# Patient Record
Sex: FEMALE | Race: OTHER | HISPANIC OR LATINO | Employment: FULL TIME | URBAN - METROPOLITAN AREA
[De-identification: names, ages, dates, MRNs, and addresses within clinical notes are randomized per-mention and may not be internally consistent; named-entity substitution may affect disease eponyms.]

---

## 2019-12-20 ENCOUNTER — OFFICE VISIT (OUTPATIENT)
Dept: URGENT CARE | Facility: CLINIC | Age: 49
End: 2019-12-20
Payer: OTHER MISCELLANEOUS

## 2019-12-20 VITALS
WEIGHT: 211 LBS | BODY MASS INDEX: 38.83 KG/M2 | HEIGHT: 62 IN | SYSTOLIC BLOOD PRESSURE: 132 MMHG | OXYGEN SATURATION: 98 % | HEART RATE: 73 BPM | DIASTOLIC BLOOD PRESSURE: 76 MMHG | TEMPERATURE: 99 F | RESPIRATION RATE: 12 BRPM

## 2019-12-20 DIAGNOSIS — S20.229A CONTUSION OF BACK, UNSPECIFIED LATERALITY, INITIAL ENCOUNTER: Primary | ICD-10-CM

## 2019-12-20 DIAGNOSIS — Z02.6 ENCOUNTER RELATED TO WORKER'S COMPENSATION CLAIM: ICD-10-CM

## 2019-12-20 DIAGNOSIS — M54.6 ACUTE BILATERAL THORACIC BACK PAIN: ICD-10-CM

## 2019-12-20 PROCEDURE — 99203 OFFICE O/P NEW LOW 30 MIN: CPT | Mod: S$GLB,,, | Performed by: FAMILY MEDICINE

## 2019-12-20 PROCEDURE — 99203 PR OFFICE/OUTPT VISIT, NEW, LEVL III, 30-44 MIN: ICD-10-PCS | Mod: S$GLB,,, | Performed by: FAMILY MEDICINE

## 2019-12-20 PROCEDURE — 72070 XR THORACIC SPINE AP LATERAL: ICD-10-PCS | Mod: FY,S$GLB,, | Performed by: RADIOLOGY

## 2019-12-20 PROCEDURE — 72070 X-RAY EXAM THORAC SPINE 2VWS: CPT | Mod: FY,S$GLB,, | Performed by: RADIOLOGY

## 2019-12-20 RX ORDER — IBUPROFEN 800 MG/1
800 TABLET ORAL 3 TIMES DAILY
Qty: 30 TABLET | Refills: 0 | Status: SHIPPED | OUTPATIENT
Start: 2019-12-20 | End: 2019-12-30

## 2019-12-20 RX ORDER — LISINOPRIL 10 MG/1
TABLET ORAL
COMMUNITY

## 2019-12-20 RX ORDER — LEVOTHYROXINE SODIUM 100 UG/1
TABLET ORAL
COMMUNITY

## 2019-12-20 RX ORDER — CYCLOBENZAPRINE HCL 10 MG
10 TABLET ORAL NIGHTLY
Qty: 30 TABLET | Refills: 0 | Status: SHIPPED | OUTPATIENT
Start: 2019-12-20 | End: 2020-01-19

## 2019-12-20 NOTE — PROGRESS NOTES
Subjective:       Patient ID: Glenda Reyes is a 49 y.o. female.    Chief Complaint: Back Pain (Hit with door in mid upper back today at 0925 at work)    [facility] C/O upper back pain after door hit her upper back at work today at approx. 0930. Handle on freezer door hit upper back     Back Pain   This is a new problem. The current episode started today. The problem occurs constantly. The problem is unchanged. The pain is present in the thoracic spine. Radiates to: bilat shoulder. The pain is at a severity of 10/10. Pertinent negatives include no abdominal pain, bladder incontinence, bowel incontinence, chest pain, dysuria, fever, headaches, leg pain, numbness, paresis, paresthesias, pelvic pain, perianal numbness, tingling, weakness or weight loss. She has tried NSAIDs for the symptoms. The treatment provided no relief.       Constitution: Negative for fever.   Cardiovascular: Negative for chest pain.   Gastrointestinal: Negative for abdominal pain and bowel incontinence.   Genitourinary: Negative for dysuria, urgency, bladder incontinence, hematuria and pelvic pain.   Musculoskeletal: Positive for back pain.   Skin: Negative for rash.   Neurological: Negative for coordination disturbances, headaches, numbness and tingling.        Objective:      Physical Exam   Constitutional: She is oriented to person, place, and time. She appears well-developed and well-nourished. She is cooperative.  Non-toxic appearance. She does not appear ill. No distress.   HENT:   Head: Normocephalic and atraumatic.   Right Ear: Hearing, tympanic membrane and ear canal normal.   Left Ear: Hearing, tympanic membrane and ear canal normal.   Nose: No mucosal edema, rhinorrhea or nasal deformity. No epistaxis. Right sinus exhibits no maxillary sinus tenderness and no frontal sinus tenderness. Left sinus exhibits no maxillary sinus tenderness and no frontal sinus tenderness.   Mouth/Throat: Uvula is midline and mucous membranes are normal. No  trismus in the jaw. Normal dentition. No uvula swelling. No posterior oropharyngeal erythema.   Eyes: Conjunctivae and lids are normal. Right eye exhibits no discharge. Left eye exhibits no discharge. No scleral icterus.   Sclera clear bilat   Neck: Trachea normal, normal range of motion, full passive range of motion without pain and phonation normal. Neck supple.   Cardiovascular: Normal rate, regular rhythm, normal heart sounds, intact distal pulses and normal pulses.   Pulmonary/Chest: Effort normal and breath sounds normal. No respiratory distress.   Abdominal: Soft. Normal appearance and bowel sounds are normal. She exhibits no distension and no pulsatile midline mass. There is no tenderness.   Musculoskeletal:        Arms:  Bilateral mid back muscles swelling, tenderness/tightness.  No T spine tenderness.  No bruise, no redness.   Neurological: She is alert and oriented to person, place, and time. She exhibits normal muscle tone. Coordination normal.   Skin: Skin is warm, dry and intact. She is not diaphoretic. No pallor.   Psychiatric: She has a normal mood and affect. Her speech is normal and behavior is normal. Judgment and thought content normal. Cognition and memory are normal.   Nursing note and vitals reviewed.      Assessment:       1. Contusion of back, unspecified laterality, initial encounter    2. Encounter related to worker's compensation claim    3. Acute bilateral thoracic back pain        Plan:         Medications Ordered This Encounter   Medications    cyclobenzaprine (FLEXERIL) 10 MG tablet     Sig: Take 1 tablet (10 mg total) by mouth every evening.     Dispense:  30 tablet     Refill:  0    ibuprofen (ADVIL,MOTRIN) 800 MG tablet     Sig: Take 1 tablet (800 mg total) by mouth 3 (three) times daily. for 10 days     Dispense:  30 tablet     Refill:  0         Patient Instructions     Back Contusion     You have a contusion to your back. A contusion is also called a bruise. There is swelling  and some bleeding under the skin. The skin may be purplish. You may have muscle aching and stiffness in the area of the bruise. There are no broken bones.  Contusions heal on their own, without further treatment. However, pain and skin discoloration may take weeks to months to go away.   Home care  · Rest. Avoid heavy lifting, strenuous exertion, or any activity that causes pain.  · Ice the area to reduce pain and swelling. Put ice cubes in a plastic bag or use a cold pack. (Wrap the cold source in a thin towel. Do not place it directly on your skin.) Ice the injured area for 20 minutes every 1-2 hours the first day. Continue with ice packs 3-4 times a day for the next two days, then as needed for the relief of pain and swelling.  · Take any prescribed pain medication. If none was prescribed, take acetaminophen, ibuprofen, or naproxen to control pain.  Follow-up care  Follow up with your healthcare provider, or as directed. Call if you are not better in 1-2 weeks.  When to seek medical advice  Call your healthcare provider for any of the following:  · New or worsening pain  · Increased swelling around the bruise  · Pain spreads to one or both legs  · Weakness or numbness in one or both legs   · Loss of bowel or bladder control  · Numbness in the groin or genital area  · Fever of 100.4°F (38ºC) or higher, or as directed by your healthcare provider  Date Last Reviewed: 6/26/2015  © 9728-5954 [employer]. [address] [place] [address], [place] [address]. All rights reserved. This information is not intended as a substitute for professional medical care. Always follow your healthcare professional's instructions.      Follow up with [facility] [address] on Monday (12/23/19)    Solitario Ness MD         No follow-ups on file.

## 2019-12-20 NOTE — PROGRESS NOTES
Subjective:       Patient ID: Glenda Reyes is a 49 y.o. female.    Vitals:  vitals were not taken for this visit.     Chief Complaint: No chief complaint on file.    HPI  ROS    Objective:      Physical Exam      Assessment:       1. Encounter related to worker's compensation claim        Plan:         Encounter related to worker's compensation claim

## 2019-12-21 NOTE — PATIENT INSTRUCTIONS
Back Contusion     You have a contusion to your back. A contusion is also called a bruise. There is swelling and some bleeding under the skin. The skin may be purplish. You may have muscle aching and stiffness in the area of the bruise. There are no broken bones.  Contusions heal on their own, without further treatment. However, pain and skin discoloration may take weeks to months to go away.   Home care  · Rest. Avoid heavy lifting, strenuous exertion, or any activity that causes pain.  · Ice the area to reduce pain and swelling. Put ice cubes in a plastic bag or use a cold pack. (Wrap the cold source in a thin towel. Do not place it directly on your skin.) Ice the injured area for 20 minutes every 1-2 hours the first day. Continue with ice packs 3-4 times a day for the next two days, then as needed for the relief of pain and swelling.  · Take any prescribed pain medication. If none was prescribed, take acetaminophen, ibuprofen, or naproxen to control pain.  Follow-up care  Follow up with your healthcare provider, or as directed. Call if you are not better in 1-2 weeks.  When to seek medical advice  Call your healthcare provider for any of the following:  · New or worsening pain  · Increased swelling around the bruise  · Pain spreads to one or both legs  · Weakness or numbness in one or both legs   · Loss of bowel or bladder control  · Numbness in the groin or genital area  · Fever of 100.4°F (38ºC) or higher, or as directed by your healthcare provider  Date Last Reviewed: 6/26/2015 © 2000-2017 [employer]. [address] [place] [address], [place] [address]. All rights reserved. This information is not intended as a substitute for professional medical care. Always follow your healthcare professional's instructions.      Follow up with [facility] [address] on Monday (12/23/19)    Solitario Ness MD

## 2019-12-23 ENCOUNTER — OFFICE VISIT (OUTPATIENT)
Dept: URGENT CARE | Facility: CLINIC | Age: 49
End: 2019-12-23
Payer: OTHER MISCELLANEOUS

## 2019-12-23 VITALS
TEMPERATURE: 99 F | RESPIRATION RATE: 14 BRPM | HEIGHT: 62 IN | OXYGEN SATURATION: 98 % | BODY MASS INDEX: 38.83 KG/M2 | SYSTOLIC BLOOD PRESSURE: 136 MMHG | HEART RATE: 80 BPM | WEIGHT: 211 LBS | DIASTOLIC BLOOD PRESSURE: 88 MMHG

## 2019-12-23 DIAGNOSIS — S20.229A CONTUSION OF BACK, UNSPECIFIED LATERALITY, INITIAL ENCOUNTER: Primary | ICD-10-CM

## 2019-12-23 DIAGNOSIS — M54.6 ACUTE BILATERAL THORACIC BACK PAIN: ICD-10-CM

## 2019-12-23 DIAGNOSIS — Z02.6 ENCOUNTER RELATED TO WORKER'S COMPENSATION CLAIM: ICD-10-CM

## 2019-12-23 DIAGNOSIS — M62.830 BACK MUSCLE SPASM: ICD-10-CM

## 2019-12-23 PROCEDURE — 99214 PR OFFICE/OUTPT VISIT, EST, LEVL IV, 30-39 MIN: ICD-10-PCS | Mod: S$GLB,,, | Performed by: NURSE PRACTITIONER

## 2019-12-23 PROCEDURE — 99214 OFFICE O/P EST MOD 30 MIN: CPT | Mod: S$GLB,,, | Performed by: NURSE PRACTITIONER

## 2019-12-23 NOTE — LETTER
[facility]  [address], [address]  [place] [address]  Phone: [phone]  Fax: [phone]  [facility]: [email or web address]    Pt Name: Glenda Reyes Injury Date: 12/20/2019   Employee ID: [id number] Date of First Treatment: 12/23/2019   Company: [employer]      Appointment Time: 02:30 PM Arrived: 2:46 p.m.   Provider: Opal Hernández NP Time Out: 4:31 p.m.     Office Treatment:   EXAM   X-Ray done  Restrictions: No lifting/pushing/pulling more than 10 lbs  Patient Instructions: Attention not to aggravate affected area, Daily home exercises/warm soaks(Take Ibuprofen and Flexeril as prescribed. Do not take Flexeril at work.)  1. Contusion of back, unspecified laterality, initial encounter    2. Acute bilateral thoracic back pain    3. Back muscle spasm    4. Encounter related to worker's compensation claim          Patient Instructions: Attention not to aggravate affected area, Daily home exercises/warm soaks(Take Ibuprofen and Flexeril as prescribed. Do not take Flexeril at work.)    Restrictions: No lifting/pushing/pulling more than 10 lbs     Return Appointment: 12/30/2019 at 2:00 p.m.  [place]

## 2019-12-23 NOTE — PROGRESS NOTES
Subjective:       Patient ID: Glenda Reyes is a 49 y.o. female.    Chief Complaint: Back Pain (12/21/19)    Pt is a f/u from the Urgent Care for back pain from 12/21/19 @ 0930. Pt states se was hit by a freezer door in the bottom of her back. C/o pain with stiffness and increases with walking and exertion 10/10, stiffness is present throughout the day. Pt states she has not been able to fill the flexeril yet but is taking the Motrin.  Ambulatory. MJB   She was tx in . X-rays of thoracic back were done (negative). Rx given for Flexeril & Ibuprofen. She will  the Rx today. Has been taking Tylenol without much relief. Has returned to work LD. No previous back injuries. Her daughter is with her today. MWT    Back Pain   This is a new problem. The problem has been rapidly improving since onset. The pain is present in the lumbar spine. The pain does not radiate. The pain is at a severity of 10/10. The pain is moderate. The pain is the same all the time. The symptoms are aggravated by position. Pertinent negatives include no chest pain, dysuria, fever, headaches or weakness. She has tried NSAIDs for the symptoms. The treatment provided significant relief.       Constitution: Negative for chills, fatigue and fever.   HENT: Negative for congestion and sore throat.    Neck: Negative for painful lymph nodes.   Cardiovascular: Negative for chest pain and leg swelling.   Eyes: Negative for double vision and blurred vision.   Respiratory: Negative for cough and shortness of breath.    Gastrointestinal: Negative for nausea, vomiting and diarrhea.   Genitourinary: Negative for dysuria, frequency, urgency and history of kidney stones.   Musculoskeletal: Positive for back pain. Negative for joint pain, joint swelling, muscle cramps, muscle ache and history of spine disorder.   Skin: Negative for color change, pale, rash, laceration, erythema and bruising.   Allergic/Immunologic: Negative for seasonal allergies.    Neurological: Negative for dizziness, history of vertigo, light-headedness, passing out and headaches.   Hematologic/Lymphatic: Negative for swollen lymph nodes.   Psychiatric/Behavioral: Negative for nervous/anxious, sleep disturbance and depression. The patient is not nervous/anxious.         Objective:      Physical Exam   Constitutional: She is oriented to person, place, and time. She appears well-developed and well-nourished. No distress.   HENT:   Right Ear: External ear normal.   Left Ear: External ear normal.   Nose: Nose normal.   Eyes: Conjunctivae are normal.   Neck: Normal range of motion.   Cardiovascular: Normal rate, regular rhythm, normal heart sounds and intact distal pulses.   Pulmonary/Chest: Effort normal.   Abdominal: Soft. Bowel sounds are normal.   Musculoskeletal: She exhibits tenderness.        Thoracic back: She exhibits decreased range of motion, tenderness, swelling, pain and spasm. She exhibits normal pulse.        Back:    Pain to thoracic back. Swelling/spasm noted to L side of back. TTP to L thoracic back. No tenderness to spine. Pain increased with ROM to shoulders. Good ROM to lower back without pain. NV intact UE & LE. Neg SLR bilaterally.   Neurological: She is alert and oriented to person, place, and time.   Skin: Skin is warm and dry. No erythema.   Psychiatric: She has a normal mood and affect. Her behavior is normal. Judgment and thought content normal.   Nursing note and vitals reviewed.      Assessment:       1. Contusion of back, unspecified laterality, initial encounter    2. Acute bilateral thoracic back pain    3. Back muscle spasm    4. Encounter related to worker's compensation claim        Plan:     X-ray Thoracic Spine Ap Lateral    Result Date: 12/20/2019  EXAMINATION: XR THORACIC SPINE AP LATERAL CLINICAL HISTORY: Pain in thoracic spine TECHNIQUE: AP and lateral views of the thoracic spine were performed. COMPARISON: None FINDINGS: There is mild leftward  curvature of the thoracic alignment.  The remainder of the alignment is unremarkable.  There is no evidence of listhesis.  The vertebral body heights are maintained.  The posterior elements are unremarkable.  The intervertebral disc spaces are within normal limits.  There is no evidence of acute fracture or listhesis of the thoracic spine. There are postoperative changes in the right upper quadrant.  The paraspinal soft tissues are otherwise unremarkable.     No evidence of acute fracture or listhesis of the thoracic spine.  Additional evaluation, as clinically warranted. Electronically signed by: Bryce Pritchett MD Date:    12/20/2019 Time:    18:54         Patient Instructions: Attention not to aggravate affected area, Daily home exercises/warm soaks(Take Ibuprofen and Flexeril as prescribed. Do not take Flexeril at work.)   Restrictions: No lifting/pushing/pulling more than 10 lbs  Follow up in about 1 week (around 12/30/2019).

## 2019-12-23 NOTE — PATIENT INSTRUCTIONS
Muscle Spasm  A muscle spasm is a sudden tightening of the muscle you cant control. This may be caused by strain, overworking the muscle, or injury. It can also be caused by dehydration, electrolyte imbalance, diabetes, alcohol use, and certain medicines. If it goes on long enough the muscle spasm causes pain. Common areas for muscle spasm are the legs, neck, and back.  Home care  · Heat, massage, and stretching will help relax muscle spasm.  · When the spasm is in your arm or leg, stretch the muscle passively. To do this, have someone bend or straighten the joint above or below the muscle until you feel the stretch on the sore muscle. You can stretch the muscle actively by moving the affected body part. This will stretch the muscle that is in spasm. For example, if the spasm is in your calf, bend the ankle so your toes point upward toward your knee. This will stretch your calf muscle.  · You may use over-the-counter pain medicine to control pain, unless another medicine was prescribed. If you have chronic liver or kidney disease or ever had a stomach ulcer or GI bleeding, talk with your healthcare provider before using these medicines.  Follow-up care  Follow up with your healthcare provider, or as advised.    When to seek medical advice  Call your healthcare provider right away if any of the following occur:  · Fingers or toes become swollen, cold, blue, numb, or tingly  · You develop weakness in the affected arm or leg  · Pain increases and is not controlled by the above measures  Date Last Reviewed: 11/21/2015  © 3395-4513 [employer]. [address] [place] [address], [place] [address]. All rights reserved. This information is not intended as a substitute for professional medical care. Always follow your healthcare professional's instructions.

## 2019-12-30 ENCOUNTER — OFFICE VISIT (OUTPATIENT)
Dept: URGENT CARE | Facility: CLINIC | Age: 49
End: 2019-12-30
Payer: OTHER MISCELLANEOUS

## 2019-12-30 DIAGNOSIS — M62.830 BACK MUSCLE SPASM: ICD-10-CM

## 2019-12-30 DIAGNOSIS — Z02.6 ENCOUNTER RELATED TO WORKER'S COMPENSATION CLAIM: ICD-10-CM

## 2019-12-30 DIAGNOSIS — M54.6 ACUTE BILATERAL THORACIC BACK PAIN: ICD-10-CM

## 2019-12-30 DIAGNOSIS — S20.229D CONTUSION OF BACK, UNSPECIFIED LATERALITY, SUBSEQUENT ENCOUNTER: Primary | ICD-10-CM

## 2019-12-30 PROCEDURE — 99213 PR OFFICE/OUTPT VISIT, EST, LEVL III, 20-29 MIN: ICD-10-PCS | Mod: S$GLB,,, | Performed by: NURSE PRACTITIONER

## 2019-12-30 PROCEDURE — 99213 OFFICE O/P EST LOW 20 MIN: CPT | Mod: S$GLB,,, | Performed by: NURSE PRACTITIONER

## 2019-12-30 NOTE — PROGRESS NOTES
"Subjective:       Patient ID: Glenda Reyes is a 49 y.o. female.    Chief Complaint: Back Pain (12/21/19)    [place] ONLY. Pt is a f/u for back pain from 12/21/19, WLD, 6/10, Advil,& home exercises helping. WLD. Ambulatory. MJB  She has not been able to get her Rx filled due to "pre authorization" needed. Was working without problems then told today that she cannot work because no light duty is available. Patient's daughter was with her this visit. MWT    Back Pain   This is a recurrent problem. The current episode started 1 to 4 weeks ago. The problem occurs daily. The problem has been gradually improving since onset. The pain is present in the lumbar spine. The quality of the pain is described as aching. The pain does not radiate. The pain is at a severity of 6/10. The pain is moderate. The pain is worse during the night. The symptoms are aggravated by bending, position and standing. Pertinent negatives include no chest pain, dysuria, fever, headaches or weakness. She has tried NSAIDs and muscle relaxant for the symptoms. The treatment provided mild relief.       Constitution: Negative for chills, fatigue and fever.   HENT: Negative for congestion and sore throat.    Neck: Negative for painful lymph nodes.   Cardiovascular: Negative for chest pain and leg swelling.   Eyes: Negative for double vision and blurred vision.   Respiratory: Negative for cough and shortness of breath.    Gastrointestinal: Negative for nausea, vomiting and diarrhea.   Genitourinary: Negative for dysuria, frequency, urgency and history of kidney stones.   Musculoskeletal: Positive for back pain. Negative for joint pain, joint swelling, muscle cramps and muscle ache.   Skin: Negative for color change, pale, rash, erythema and bruising.   Allergic/Immunologic: Negative for seasonal allergies.   Neurological: Negative for dizziness, history of vertigo, light-headedness, passing out and headaches.   Hematologic/Lymphatic: Negative for swollen " lymph nodes.   Psychiatric/Behavioral: Negative for nervous/anxious, sleep disturbance and depression. The patient is not nervous/anxious.         Objective:      Physical Exam   Constitutional: She is oriented to person, place, and time. She appears well-developed and well-nourished. No distress.   HENT:   Right Ear: External ear normal.   Left Ear: External ear normal.   Nose: Nose normal.   Eyes: Conjunctivae are normal.   Cardiovascular: Intact distal pulses.   Pulmonary/Chest: Effort normal.   Musculoskeletal: She exhibits tenderness.        Thoracic back: She exhibits tenderness, pain and spasm. She exhibits normal range of motion.        Back:    Pain and swelling noted to L side of thoracic back but improved since last week. Mild TTP. Spasm appreciated. FROM to back and shoulders with mild pain.   Neurological: She is alert and oriented to person, place, and time.   Skin: Skin is warm and dry. No erythema.   Psychiatric: She has a normal mood and affect. Her behavior is normal. Judgment normal.       Assessment:       1. Contusion of back, unspecified laterality, subsequent encounter    2. Acute bilateral thoracic back pain    3. Back muscle spasm    4. Encounter related to worker's compensation claim        Plan:            Patient Instructions: Attention not to aggravate affected area, Daily home exercises/warm soaks(May take OTC Ibprofen. Do not take Flexeril while working.)   Restrictions: Regular Duty  Follow up in about 1 week (around 1/6/2020).

## 2019-12-30 NOTE — LETTER
[facility]  [address], [address]  [place] [address]  Phone: [phone]  Fax: [phone]  [facility]: [email or web address]    Pt Name: Glenda Reyes  Injury Date: 12/20/2019   Employee ID:[id number] Date of  Treatment: 12/30/2019   Company:  [employer]      Appointment Time: 01:45 PM Time in : 2:04pm   Provider: [facility] Time Out:3:35pm     Office Treatment:     1. Contusion of back, unspecified laterality, subsequent encounter    2. Acute bilateral thoracic back pain    3. Back muscle spasm    4. Encounter related to worker's compensation claim          Patient Instructions: Attention not to aggravate affected area, Daily home exercises/warm soaks(May take OTC Ibprofen. Do not take Flexeril while working.)    Restrictions: Regular Duty     Return Appointment: 1/6/2020 at 1:00pm  LN

## 2020-01-06 ENCOUNTER — OFFICE VISIT (OUTPATIENT)
Dept: URGENT CARE | Facility: CLINIC | Age: 50
End: 2020-01-06
Payer: OTHER MISCELLANEOUS

## 2020-01-06 DIAGNOSIS — S20.229D CONTUSION OF BACK, UNSPECIFIED LATERALITY, SUBSEQUENT ENCOUNTER: Primary | ICD-10-CM

## 2020-01-06 DIAGNOSIS — M54.6 ACUTE BILATERAL THORACIC BACK PAIN: ICD-10-CM

## 2020-01-06 DIAGNOSIS — M62.830 BACK MUSCLE SPASM: ICD-10-CM

## 2020-01-06 DIAGNOSIS — Z02.6 ENCOUNTER RELATED TO WORKER'S COMPENSATION CLAIM: ICD-10-CM

## 2020-01-06 PROCEDURE — 99214 PR OFFICE/OUTPT VISIT, EST, LEVL IV, 30-39 MIN: ICD-10-PCS | Mod: S$GLB,,, | Performed by: NURSE PRACTITIONER

## 2020-01-06 PROCEDURE — 99214 OFFICE O/P EST MOD 30 MIN: CPT | Mod: S$GLB,,, | Performed by: NURSE PRACTITIONER

## 2020-01-06 NOTE — PROGRESS NOTES
Subjective:       Patient ID: Glenda Reyes is a 49 y.o. female.    Chief Complaint: Back Pain (12/21/19)    Pt is a f/u for back pain from 12/21/19, pain is 4/10, meds helping, working light duty. [place] Speaking only. Ambulatory. LIZ  Has started to take the Flexeril at bedtime and it has helped a lot. Overall feeling better. She is accompanied by her daughter. MWT    Back Pain   This is a recurrent problem. The current episode started 1 to 4 weeks ago. The problem occurs intermittently. The problem has been gradually improving since onset. The pain is present in the lumbar spine. The quality of the pain is described as aching. The pain does not radiate. The pain is at a severity of 4/10. The symptoms are aggravated by position. Pertinent negatives include no chest pain, dysuria, fever, headaches or weakness. The treatment provided mild relief.       Constitution: Negative for chills, fatigue and fever.   HENT: Negative for congestion and sore throat.    Neck: Negative for painful lymph nodes.   Cardiovascular: Negative for chest pain and leg swelling.   Eyes: Negative for double vision and blurred vision.   Respiratory: Negative for cough and shortness of breath.    Gastrointestinal: Negative for nausea, vomiting and diarrhea.   Genitourinary: Negative for dysuria, frequency, urgency and history of kidney stones.   Musculoskeletal: Positive for back pain. Negative for joint pain, joint swelling, muscle cramps and muscle ache.   Skin: Negative for color change, pale, rash and bruising.   Allergic/Immunologic: Negative for seasonal allergies.   Neurological: Negative for dizziness, history of vertigo, light-headedness, passing out and headaches.   Hematologic/Lymphatic: Negative for swollen lymph nodes.   Psychiatric/Behavioral: Negative for nervous/anxious, sleep disturbance and depression. The patient is not nervous/anxious.         Objective:      Physical Exam   Constitutional: She is oriented to person, place,  and time. She appears well-developed and well-nourished. No distress.   HENT:   Right Ear: External ear normal.   Left Ear: External ear normal.   Nose: Nose normal.   Eyes: Conjunctivae are normal.   Neck: Normal range of motion.   Cardiovascular: Intact distal pulses.   Pulmonary/Chest: Effort normal.   Musculoskeletal: She exhibits tenderness.        Thoracic back: She exhibits tenderness and pain. She exhibits normal range of motion, no swelling, no spasm and normal pulse.        Back:    Pain across thoracic back with palpation and ROM. Swelling and spasm have decreased this week. FROM to back, neck and shoulders with mild pain. NV intact distally.   Neurological: She is alert and oriented to person, place, and time.   Skin: Skin is warm and dry. Capillary refill takes less than 2 seconds.   Psychiatric: She has a normal mood and affect. Her behavior is normal. Judgment and thought content normal.       Assessment:       1. Contusion of back, unspecified laterality, subsequent encounter    2. Acute bilateral thoracic back pain    3. Back muscle spasm    4. Encounter related to worker's compensation claim        Plan:            Patient Instructions: Attention not to aggravate affected area, Daily home exercises/warm soaks(May take OTC Ibuprofen. Do not take Flexeril while working.)   Restrictions: Regular Duty  Follow up in about 2 weeks (around 1/20/2020).

## 2020-01-06 NOTE — LETTER
[facility]  [address], [address]  [facility] [address]  Phone: [phone]  Fax: [phone]  [facility]: [id number]    Pt Name: Glenda Reyes  Injury Date: 12/20/2019   Employee ID: [id number] Date of Treatment: 01/06/2020   Company: [employer]      Appointment Time: 12:45 PM Arrived: 1:12 PM   Provider: Opal Hernández NP Time Out:1:55 PM     Office Treatment:   1. Contusion of back, unspecified laterality, subsequent encounter    2. Acute bilateral thoracic back pain    3. Back muscle spasm    4. Encounter related to worker's compensation claim          Patient Instructions: Attention not to aggravate affected area, Daily home exercises/warm soaks(May take OTC Ibuprofen. Do not take Flexeril while working.)    Restrictions: Regular Duty     Return Appointment: 1/20/2020 at 09:00 AM  LIZ

## 2020-01-22 ENCOUNTER — OFFICE VISIT (OUTPATIENT)
Dept: URGENT CARE | Facility: CLINIC | Age: 50
End: 2020-01-22
Payer: OTHER MISCELLANEOUS

## 2020-01-22 DIAGNOSIS — S20.229D CONTUSION OF BACK, UNSPECIFIED LATERALITY, SUBSEQUENT ENCOUNTER: Primary | ICD-10-CM

## 2020-01-22 DIAGNOSIS — M62.830 BACK MUSCLE SPASM: ICD-10-CM

## 2020-01-22 DIAGNOSIS — Z02.6 ENCOUNTER RELATED TO WORKER'S COMPENSATION CLAIM: ICD-10-CM

## 2020-01-22 DIAGNOSIS — M54.6 ACUTE BILATERAL THORACIC BACK PAIN: ICD-10-CM

## 2020-01-22 PROCEDURE — 99214 OFFICE O/P EST MOD 30 MIN: CPT | Mod: S$GLB,,, | Performed by: NURSE PRACTITIONER

## 2020-01-22 PROCEDURE — 99214 PR OFFICE/OUTPT VISIT, EST, LEVL IV, 30-39 MIN: ICD-10-PCS | Mod: S$GLB,,, | Performed by: NURSE PRACTITIONER

## 2020-01-22 RX ORDER — CYCLOBENZAPRINE HCL 10 MG
10 TABLET ORAL NIGHTLY PRN
Qty: 30 TABLET | Refills: 0 | Status: SHIPPED | OUTPATIENT
Start: 2020-01-22 | End: 2020-01-22 | Stop reason: CLARIF

## 2020-01-22 RX ORDER — CYCLOBENZAPRINE HCL 10 MG
10 TABLET ORAL NIGHTLY PRN
Qty: 30 TABLET | Refills: 0 | Status: SHIPPED | OUTPATIENT
Start: 2020-01-22 | End: 2020-02-19 | Stop reason: ALTCHOICE

## 2020-01-22 NOTE — LETTER
[facility]  [address], [address]  [place] [facility] [address]  Phone: [phone]  Fax: [phone]  [facility]: [id number]    Pt Name: Glenda Reyes  Injury Date: 12/20/2019   Employee ID: [id number] Date of Treatment: 01/22/2020   Company:  [employer]      Appointment Time: 3:00 PM Arrived: 2:20 PM   Provider: Opal Hernández NP Time Out: 3:40 PM     Office Treatment:   1. Contusion of back, unspecified laterality, subsequent encounter    2. Acute bilateral thoracic back pain    3. Back muscle spasm    4. Encounter related to worker's compensation claim      Medications Ordered This Encounter   Medications    cyclobenzaprine (FLEXERIL) 10 MG tablet      Patient Instructions: Daily home exercises/warm soaks, Attention not to aggravate affected area    Restrictions: Regular Duty     Return Appointment: 2/5/2020 at 3:00 PM       TIRSO

## 2020-01-22 NOTE — PROGRESS NOTES
Subjective:       Patient ID: Glenda Reyes is a 49 y.o. female.    Chief Complaint: Back Pain (lower)    RV- Pt is a f/u for back pain  [place]  Food  from 12/21/19, pain is 4/10, IBP meds helping,  Pt work status is regular duty . [employer] only. Ambulatory.Has started to take the Flexeril at bedtime and it has helped a lot. Overall feeling better. She is accompanied by her daughter. -LN    Back Pain   This is a recurrent problem. The current episode started 1 to 4 weeks ago. The problem occurs intermittently. The problem has been gradually improving since onset. The pain is present in the lumbar spine. The quality of the pain is described as aching. The pain does not radiate. The pain is at a severity of 4/10. The symptoms are aggravated by position, bending and standing. Stiffness is present at night. Pertinent negatives include no abdominal pain, chest pain, dysuria, fever, headaches, numbness, tingling or weakness. She has tried heat for the symptoms. The treatment provided mild relief.       Constitution: Negative for chills and fever.   HENT: Negative for congestion.    Cardiovascular: Negative for chest pain.   Respiratory: Negative for shortness of breath.    Gastrointestinal: Negative for abdominal pain, nausea and vomiting.   Genitourinary: Negative for dysuria.   Musculoskeletal: Positive for pain and back pain. Negative for joint pain, joint swelling, muscle cramps and muscle ache.   Neurological: Negative for headaches, numbness and tingling.        Objective:      Physical Exam   Constitutional: She is oriented to person, place, and time. She appears well-developed and well-nourished. No distress.   HENT:   Right Ear: External ear normal.   Left Ear: External ear normal.   Nose: Nose normal.   Eyes: Conjunctivae are normal.   Neck: Normal range of motion.   Cardiovascular: Intact distal pulses.   Pulmonary/Chest: Effort normal.   Musculoskeletal: She exhibits tenderness.        Thoracic back:  She exhibits tenderness, pain and spasm. She exhibits normal range of motion and normal pulse.        Back:    More pain and tenderness to L side of thoracic back. Still has area on L side that resembles a large lipoma but much improved from 1st visit. Increased pain with adduction of arms and forward flexion. Also c/o pain to trapezius areas bilaterally.   Neurological: She is alert and oriented to person, place, and time.   Skin: Skin is warm and dry.   Psychiatric: She has a normal mood and affect. Her behavior is normal. Judgment and thought content normal.       Assessment:       1. Contusion of back, unspecified laterality, subsequent encounter    2. Acute bilateral thoracic back pain    3. Back muscle spasm    4. Encounter related to worker's compensation claim        Plan:            Patient Instructions: Daily home exercises/warm soaks, Attention not to aggravate affected area   Restrictions: Regular Duty  Follow up in about 2 weeks (around 2/5/2020).

## 2020-02-05 ENCOUNTER — OFFICE VISIT (OUTPATIENT)
Dept: URGENT CARE | Facility: CLINIC | Age: 50
End: 2020-02-05
Payer: OTHER MISCELLANEOUS

## 2020-02-05 DIAGNOSIS — M62.830 BACK MUSCLE SPASM: ICD-10-CM

## 2020-02-05 DIAGNOSIS — S20.229D CONTUSION OF BACK, UNSPECIFIED LATERALITY, SUBSEQUENT ENCOUNTER: Primary | ICD-10-CM

## 2020-02-05 DIAGNOSIS — M54.6 ACUTE BILATERAL THORACIC BACK PAIN: ICD-10-CM

## 2020-02-05 DIAGNOSIS — Z02.6 ENCOUNTER RELATED TO WORKER'S COMPENSATION CLAIM: ICD-10-CM

## 2020-02-05 PROCEDURE — 99214 OFFICE O/P EST MOD 30 MIN: CPT | Mod: S$GLB,,, | Performed by: PREVENTIVE MEDICINE

## 2020-02-05 PROCEDURE — 99214 PR OFFICE/OUTPT VISIT, EST, LEVL IV, 30-39 MIN: ICD-10-PCS | Mod: S$GLB,,, | Performed by: PREVENTIVE MEDICINE

## 2020-02-05 NOTE — LETTER
[facility]  [address], [address]  [place] [place] [facility] [address]  Phone: [phone]  Fax: [phone]  [facility]: [id number]    Pt Name: Glenda Reyes  Injury Date: 12/20/2019   Employee ID: [id number] Date of Treatment: 02/05/2020   Company: [employer]      Appointment Time: 03:00 PM Arrived: 2:53 PM   Provider: [facility] Time Out: 3:45 PM     Office Treatment:   1. Contusion of back, unspecified laterality, subsequent encounter    2. Acute bilateral thoracic back pain    3. Back muscle spasm    4. Encounter related to worker's compensation claim          Patient Instructions: Attention not to aggravate affected area, Daily home exercises/warm soaks, PT to be scheduled once authorized    Restrictions: Regular Duty     Return Appointment: 2/19/2020 at 2:30 PM       SH

## 2020-02-05 NOTE — PROGRESS NOTES
Subjective:       Patient ID: Glenda Reyes is a 49 y.o. female.    Chief Complaint: Back Pain (12/21/19)    Pt is a f/u for back from 12/21/19, [facility], meds helpling, 4/10 ambulatory. MJB  She has been doing regular duty. Her daughter accompanies her today. MWT    Back Pain   This is a recurrent problem. The current episode started more than 1 month ago. The problem occurs intermittently. The problem has been gradually improving since onset. The pain is present in the lumbar spine. The quality of the pain is described as aching. The pain does not radiate. The pain is at a severity of 4/10. The pain is moderate. The pain is worse during the day. Pertinent negatives include no abdominal pain, bladder incontinence, bowel incontinence, dysuria or numbness. She has tried NSAIDs for the symptoms. The treatment provided moderate relief.       Constitution: Negative for fatigue.   Gastrointestinal: Negative for abdominal pain and bowel incontinence.   Genitourinary: Negative for dysuria, urgency, bladder incontinence and hematuria.   Musculoskeletal: Positive for back pain. Negative for muscle cramps and history of spine disorder.   Skin: Negative for rash.   Neurological: Negative for coordination disturbances, numbness and tingling.        Objective:      Physical Exam   Constitutional: She is oriented to person, place, and time. She appears well-developed and well-nourished. No distress.   HENT:   Right Ear: External ear normal.   Left Ear: External ear normal.   Nose: Nose normal.   Eyes: Conjunctivae are normal.   Neck: Normal range of motion.   Pulmonary/Chest: Effort normal.   Musculoskeletal: Normal range of motion. She exhibits tenderness.        Thoracic back: She exhibits tenderness, swelling, pain and spasm. She exhibits normal range of motion.        Back:    Continues to have pain & TTP to L side of thoracic back. Mild swelling & spasm. Good ROM to back & shoulders with c/o pain with overhead reaching. Also c/o  pain to trapezius bilaterally with neck ROM.   Neurological: She is alert and oriented to person, place, and time.   Skin: Skin is warm and dry.   Psychiatric: She has a normal mood and affect. Her behavior is normal. Judgment and thought content normal.       Assessment:       1. Contusion of back, unspecified laterality, subsequent encounter    2. Acute bilateral thoracic back pain    3. Back muscle spasm    4. Encounter related to worker's compensation claim        Plan:       Pt continues to have TTP and mild swelling to L thoracic back. Dr Ruffin also examined her today. PT ordered.     Patient Instructions: Attention not to aggravate affected area, Daily home exercises/warm soaks, PT to be scheduled once authorized   Restrictions: Regular Duty  Follow up in about 2 weeks (around 2/19/2020).

## 2020-02-19 ENCOUNTER — OFFICE VISIT (OUTPATIENT)
Dept: URGENT CARE | Facility: CLINIC | Age: 50
End: 2020-02-19
Payer: OTHER MISCELLANEOUS

## 2020-02-19 DIAGNOSIS — S20.229D CONTUSION OF BACK, UNSPECIFIED LATERALITY, SUBSEQUENT ENCOUNTER: Primary | ICD-10-CM

## 2020-02-19 DIAGNOSIS — M54.6 ACUTE BILATERAL THORACIC BACK PAIN: ICD-10-CM

## 2020-02-19 PROCEDURE — 99214 PR OFFICE/OUTPT VISIT, EST, LEVL IV, 30-39 MIN: ICD-10-PCS | Mod: S$GLB,,, | Performed by: PREVENTIVE MEDICINE

## 2020-02-19 PROCEDURE — 99214 OFFICE O/P EST MOD 30 MIN: CPT | Mod: S$GLB,,, | Performed by: PREVENTIVE MEDICINE

## 2020-02-19 RX ORDER — CYCLOBENZAPRINE HCL 10 MG
10 TABLET ORAL NIGHTLY
Qty: 30 TABLET | Refills: 0 | Status: SHIPPED | OUTPATIENT
Start: 2020-02-19 | End: 2020-02-29

## 2020-02-19 NOTE — LETTER
[facility]  [address], [address]  [place] [place] [place] [place] [address]  Phone: [phone]  Fax: [phone]  [facility]: [id number]    Pt Name: Glenda Reyes  Injury Date: 12/20/2019   Employee ID: [id number] Date of Treatment: 02/19/2020   Company: [employer]      Appointment Time: 02:30 PM Arrived: 2:27 PM   Provider: [facility] Time Out: 3:30 PM     Office Treatment:   1. Contusion of back, unspecified laterality, subsequent encounter    2. Acute bilateral thoracic back pain      Medications Ordered This Encounter   Medications    cyclobenzaprine (FLEXERIL) 10 MG tablet      Patient Instructions: Daily home exercises/warm soaks, Begin Physical Therapy    Restrictions: Regular Duty     Return Appointment: 3/4/2020 at 2:30 PM     SH

## 2020-03-11 ENCOUNTER — OFFICE VISIT (OUTPATIENT)
Dept: URGENT CARE | Facility: CLINIC | Age: 50
End: 2020-03-11
Payer: OTHER MISCELLANEOUS

## 2020-03-11 DIAGNOSIS — S20.229D CONTUSION OF BACK, UNSPECIFIED LATERALITY, SUBSEQUENT ENCOUNTER: Primary | ICD-10-CM

## 2020-03-11 DIAGNOSIS — M54.6 ACUTE BILATERAL THORACIC BACK PAIN: ICD-10-CM

## 2020-03-11 DIAGNOSIS — M62.830 BACK MUSCLE SPASM: ICD-10-CM

## 2020-03-11 PROCEDURE — 99214 OFFICE O/P EST MOD 30 MIN: CPT | Mod: S$GLB,,, | Performed by: PREVENTIVE MEDICINE

## 2020-03-11 PROCEDURE — 99214 PR OFFICE/OUTPT VISIT, EST, LEVL IV, 30-39 MIN: ICD-10-PCS | Mod: S$GLB,,, | Performed by: PREVENTIVE MEDICINE

## 2020-03-11 NOTE — LETTER
[facility]  [address], [address]  [place] [facility] [address]  Phone: [phone]  Fax: [phone]  [facility]: [id number]    Pt Name: Glenda Reyes  Injury Date: 12/20/2019   Employee ID:  Date of Treatment: 03/11/2020   Company:  [employer]      Appointment Time: 12:25 PM Arrived: 12:44 PM   Provider: Robert Ruffin MD Time Out:2:35 PM     Office Treatment:   1. Contusion of back, unspecified laterality, subsequent encounter    2. Acute bilateral thoracic back pain    3. Back muscle spasm          Patient Instructions: Daily home exercises/warm soaks, Continue Physical Therapy(Patient will finish physical therapy treatments previously prescribed)      Restrictions: Regular Duty     Return Appointment: 4/1/2020 at 10:30 AM  LIZ

## 2020-03-11 NOTE — PROGRESS NOTES
Subjective:       Patient ID: Glenda Reyes is a 49 y.o. female.    Chief Complaint: Back Pain    Pt is being seen today for a follow up follow up for a back injury from 12/21/2019.  Her pain level is 2/10 today.  She continues to taker her medication with moderate relief.  She is working regular duty with [employer].  KD      Constitution: Negative for fatigue.   Gastrointestinal: Negative for abdominal pain and bowel incontinence.   Genitourinary: Negative for dysuria, urgency, bladder incontinence and hematuria.   Musculoskeletal: Positive for pain and joint pain. Negative for muscle cramps and history of spine disorder.   Skin: Negative for rash.   Neurological: Negative for coordination disturbances, numbness and tingling.        Objective:      Physical Exam   Constitutional: She appears well-developed and well-nourished.   HENT:   Head: Normocephalic.   Eyes: Pupils are equal, round, and reactive to light.   Neck: Normal range of motion.   Cardiovascular: Normal rate.   Pulmonary/Chest: Effort normal.   Musculoskeletal:        Thoracic back: She exhibits decreased range of motion, tenderness and pain. She exhibits no bony tenderness, no swelling, no edema, no deformity, no laceration, no spasm and normal pulse.        Lumbar back: She exhibits no bony tenderness, no swelling, no edema, no deformity, no laceration, no spasm and normal pulse.        Back:    Pain persists about midthoracic spine without swelling or spasm noted. Patient continues to complain of pain with palpation and range of motion testing of the thoracic spine.  She has pain with forward flexion to approximately 45°, extension to 10°, and lateral bending to 25°.    There are no signs of motor or sensory deficits about the upper extremities.  Her lungs are otherwise clear of rotation and percussion.   Neurological: She is alert.   No focal neurologic deficits   Skin: Skin is warm.   Psychiatric: She has a normal mood and affect.    Nursing note and vitals reviewed.      Assessment:       1. Contusion of back, unspecified laterality, subsequent encounter    2. Acute bilateral thoracic back pain    3. Back muscle spasm        Plan:     patient has much improved symptoms with physical therapy and exercises at home.  She will continue taking Flexeril only as needed at night.  She will return to clinic in approximately 3 weeks to likely be discharged.       Patient Instructions: Daily home exercises/warm soaks, Continue Physical Therapy(Patient will finish physical therapy treatments previously prescribed)   Restrictions: Regular Duty  Follow up in about 3 weeks (around 4/1/2020).